# Patient Record
Sex: FEMALE | Race: WHITE | NOT HISPANIC OR LATINO | Employment: FULL TIME | URBAN - METROPOLITAN AREA
[De-identification: names, ages, dates, MRNs, and addresses within clinical notes are randomized per-mention and may not be internally consistent; named-entity substitution may affect disease eponyms.]

---

## 2017-11-09 ENCOUNTER — ALLSCRIPTS OFFICE VISIT (OUTPATIENT)
Dept: OTHER | Facility: OTHER | Age: 22
End: 2017-11-09

## 2017-11-09 DIAGNOSIS — N93.9 ABNORMAL UTERINE AND VAGINAL BLEEDING, UNSPECIFIED (CODE): ICD-10-CM

## 2017-11-11 NOTE — PROGRESS NOTES
Assessment    1  Abnormal uterine bleeding (626 9) (N93 9)   2  Primary anovulatory infertility (628 0) (N97 0)   3  Obesity, morbid, BMI 40 0-49 9 (278 01) (E66 01)    Plan  Abnormal uterine bleeding    · (1) PROLACTIN; Status:Active - Retrospective By Protocol Authorization; Requestedfor:09Nov2017;    Perform:[facility]; Order Comments:FASTING!!!!!; OEY:[id number]; Last Updated By:Edin Carcamo; 11/9/2017 1:37:18 PM;Ordered;uterine bleeding; Ordered By:Iggy Erwin;   · (1) TSH; Status:Active - Retrospective By Protocol Authorization; Requestedfor:09Nov2017;    Perform:[facility]; UVP:[id number]; Last Updated By:Edin Carcamo; 11/9/2017 1:37:18 PM;Ordered;uterine bleeding; Ordered By:Iggy Erwni;   · * US PELVIS COMPLETE (TRANSABDOMINAL AND TRANSVAGINAL); Status:Hold For -Scheduling,Retrospective By Protocol Authorization; Requested XVC:[id number];    Perform:[facility]; ZVS:[id number]; Last Updated By:Edin Carcamo; 11/9/2017 1:37:18 PM;Ordered;uterine bleeding; Ordered By:Iggy Erwin;    Discussion/Summary  Discussion Summary:   AUB -0infertility  Patient has been attempting to conceive for greater than 1 year, and has been sexually active at the appropriate times in her cycle  impact of her excess weight on ovulation discussed with patient and encouraged her to continue her weight loss efforts through her reduction in calories and exercise  order to get fasting prolactin and a TSH drawn  Given order for pelvic ultrasound  advised to continue her prenatal vitamins  will call her with the results of her testing and we will proceed from there  Counseling Documentation With Imm: The patient was counseled regarding diagnostic results,-- instructions for management,-- risk factor reductions,-- prognosis,-- patient and family education,-- impressions,-- risks and benefits of treatment options,-- importance of compliance with treatment   total time of encounter was 25 minutes-- and-- 24 minutes was spent counseling  GYN Discussion and Summary:             Dysfunctional Uterine Bleeding--  Goals and Barriers: The patient has the current Goals:   Irregular bleeding and conceive  The patent has the current Barriers: Morbid obesity  Patient's Capacity to Self-Care: Patient is able to Self-Care  Self Referrals:   Self Referrals: Yes      Chief Complaint  Chief Complaint Free Text Note Form: Pt presents today for aub  History of Present Illness  HPI: 25Year old new patient here to discuss abnormal uterine bleeding  She is also interested in discussing becoming pregnant, as she has been trying to conceive for > 1 year without success   is in the [employer] and she has already seen 1 of the doctors there who told her to lose weight and come back  She states she had been on birth control pills and stopped 1 year ago when she wanted to conceive  She states that her most recent menses has been lasting for about 3-4 weeks, is very light and has minimal cramping  She is taking prenatal vitamins  Her 59-year-old  has not had children with any other women  states she has not had any changes in her weight, she currently weighs 253  takes no medications and has no significant past medical history  history: She is  her  is in [place] but is moving back to [place] soon  She works at a [address] [place] [address] [address] in [place]  Review of Systems   Female ROS: nonmenstrual bleeding,-- periods are irregular-- and-- irregular length of periods, but-- no pelvic pain,-- no pelvic pressure,-- no vaginal discharge,-- no vaginal itching,-- no vaginal lump or mass,-- no vulvar pain,-- no vulvar itching,-- no dysmenorrhea,-- no dysuria-- and-- no urinary loss of control  Active Problems  1  Abnormal uterine bleeding (626 9) (N93 9)    Past Medical History  Active Problems And Past Medical History Reviewed: The active problems and past medical history were reviewed and updated today        Surgical History  Surgical History Reviewed: The surgical history was reviewed and updated today  Family History  Family History Reviewed: The family history was reviewed and updated today  Social History   · Never a smoker  Social History Reviewed: The social history was reviewed and updated today  The social history was reviewed and is unchanged  Current Meds   1  No Reported Medications Recorded  Medication List Reviewed: The medication list was reviewed and updated today  Allergies  1  No Known Drug Allergies    Vitals  Vital Signs    Recorded: [id number] 63:38SY   Systolic 757   Diastolic 64   Height 5 ft 6 in   Weight 253 lb 9 oz   BMI Calculated 40 93   BSA Calculated 2 21   LMP [id number]       Physical Exam   Constitutional  General appearance: No acute distress, well appearing and well nourished  Signatures   Electronically signed by :  Martha Spencer; Nov 9 2017  2:32PM EST                       (Author)    Electronically signed by : Johnnie Crigler, M D ; Nov 10 2017  3:57PM EST

## 2017-11-14 ENCOUNTER — HOSPITAL ENCOUNTER (OUTPATIENT)
Dept: ULTRASOUND IMAGING | Facility: HOSPITAL | Age: 22
Discharge: HOME | End: 2017-11-14
Payer: OTHER GOVERNMENT

## 2017-11-14 DIAGNOSIS — N93.9 ABNORMAL UTERINE AND VAGINAL BLEEDING, UNSPECIFIED (CODE): ICD-10-CM

## 2017-11-14 PROCEDURE — 76830 TRANSVAGINAL US NON-OB: CPT

## 2017-11-14 PROCEDURE — 76856 US EXAM PELVIC COMPLETE: CPT

## 2017-12-28 ENCOUNTER — GENERIC CONVERSION - ENCOUNTER (OUTPATIENT)
Dept: OTHER | Facility: OTHER | Age: 22
End: 2017-12-28

## 2017-12-29 ENCOUNTER — GENERIC CONVERSION - ENCOUNTER (OUTPATIENT)
Dept: OTHER | Facility: OTHER | Age: 22
End: 2017-12-29

## 2018-01-14 VITALS
SYSTOLIC BLOOD PRESSURE: 110 MMHG | WEIGHT: 253.56 LBS | DIASTOLIC BLOOD PRESSURE: 64 MMHG | BODY MASS INDEX: 40.75 KG/M2 | HEIGHT: 66 IN

## 2018-01-22 ENCOUNTER — GENERIC CONVERSION - ENCOUNTER (OUTPATIENT)
Dept: OTHER | Facility: OTHER | Age: 23
End: 2018-01-22

## 2018-01-23 NOTE — MISCELLANEOUS
Message   Recorded as Task   Date: 12/19/2017 01:09 PM, Created By: Yadira Callahan   Task Name: Follow Up   Assigned To: Yadira Callahan   Regarding Patient: Fidelina De Jesus, Status: Active   Comment:    Yadira Callahan - 19 Dec 2017 1:09 PM     TASK CREATED  pt called in stating she finished the progesterone on 12/8  Pt started bleeding again 12/14 and states it has been heavy since  Pt questioning if she should continue to monitor or if she should be concerned  States last time this happened she bled for 7 weeks  How would you like pt to proceed?       ok to lm for pt   Camila Erwin - 20 Dec 2017 3:34 PM     TASK REPLIED TO: Previously Assigned To MolinaterenceCamila  I recommend she monitor it if not too heavy and hopefully will stop soon  another option is to put her on the pill for a few cycles, then when stops it menses may revert back to normal    Yadira Callahan - 20 Dec 2017 4:08 PM     TASK REPLIED TO: Previously Assigned To [facility],Team                      lm to Krissy Blanton - 28 Dec 2017 2:05 PM     TASK REASSIGNED: Previously Assigned To [facility],Team  vm full  unable to lm  will send pt a letter to contact us   please see task below  Active Problems    1  Abnormal uterine bleeding (626 9) (N93 9)   2  Obesity, morbid, BMI 40 0-49 9 (278 01) (E66 01)   3  Primary anovulatory infertility (628 0) (N97 0)    Current Meds   1  Norethindrone Acetate 5 MG Oral Tablet; TAKE 2 TABLET Daily TDD:10 MG; Therapy: [id number] to (Blenda Grills)  Requested for: [id number]; Last   Rx:29Nov2017; Status: ACTIVE - Transmit to Pharmacy - Awaiting Verification   Ordered    Allergies    1  No Known Drug Allergies    Signatures   Electronically signed by :  Zoila Barajas; Dec 28 2017  4:30PM EST                       (Author)

## 2018-01-23 NOTE — MISCELLANEOUS
Message   Recorded as Task   Date: 12/19/2017 01:09 PM, Created By: Celia Hernandez   Task Name: Follow Up   Assigned To: Celia Hernandez   Regarding Patient: Stoney Brewer, Status: Active   Comment:    Celia Hernandez - 19 Dec 2017 1:09 PM     TASK CREATED  pt called in stating she finished the progesterone on 12/8  Pt started bleeding again 12/14 and states it has been heavy since  Pt questioning if she should continue to monitor or if she should be concerned  States last time this happened she bled for 7 weeks  How would you like pt to proceed?       ok to lm for pt   YaimaCamila - 20 Dec 2017 3:34 PM     TASK REPLIED TO: Previously Assigned To Camila Erwin  I recommend she monitor it if not too heavy and hopefully will stop soon  another option is to put her on the pill for a few cycles, then when stops it menses may revert back to normal    Celia Hernandez - 20 Dec 2017 4:08 PM     TASK REPLIED TO: Previously Assigned To [facility],Team                      lm to Krissy Blanton - 28 Dec 2017 2:05 PM     TASK REASSIGNED: Previously Assigned To [facility],Team  vm full  unable to lm  will send pt a letter to contact us   Celia Hernandez - 29 Dec 2017 11:56 AM     TASK REPLIED TO: Previously Assigned To Celia Hernandez                      letter sent   please see task below  Active Problems    1  Abnormal uterine bleeding (626 9) (N93 9)   2  Obesity, morbid, BMI 40 0-49 9 (278 01) (E66 01)   3  Primary anovulatory infertility (628 0) (N97 0)    Current Meds   1  Norethindrone Acetate 5 MG Oral Tablet; TAKE 2 TABLET Daily TDD:10 MG; Therapy: [id number] to (Mil Gutierrez)  Requested for: [id number]; Last   Rx:29Nov2017; Status: ACTIVE - Transmit to Pharmacy - Awaiting Verification   Ordered    Allergies    1  No Known Drug Allergies    Signatures   Electronically signed by :  Rosa M Montanez; Dec 29 2017 12:57PM EST                       (Author)

## 2018-01-24 NOTE — MISCELLANEOUS
Message   Recorded as Task   Date: 01/09/2018 01:17 PM, Created By: Huy Morton   Task Name: Care Coordination   Assigned To: Tee Meeks   Regarding Patient: Nikki Akins, Status: Active   Comment:    Huy Morton - 09 Jan 2018 1:17 PM     TASK CREATED  Patient wanted to get a second opinion  Started LMP 12/14/2017 bled heavy every day with quarter size/golf ball size one at least every day up until last Tuesday 1/2/18 when the bleeding stopped  She is experiencing left lower quad pain/cramping  Patient wants to start to conceive  What do you recommend doing next? phone: [phone]  we can leave a detailed message on her voicemail because of her work schedule it will be difficult to talk on the phone  Falguni Mar - 10 Eldon 2018 9:52 AM     TASK REPLIED TO: Previously Assigned To Falguni Mar  needs appointment to discuss Pat Chapa - 18 Jan 2018 3:50 PM     TASK REASSIGNED: Previously Assigned To Manny Ellison - 22 Jan 2018 9:09 AM     TASK REPLIED TO: Previously Assigned To [facility],Team  lmovm to Krissy Blanton - 22 Jan 2018 12:27 PM     TASK REASSIGNED: Previously Assigned To [facility]Team  please send a letter  please see task below  Active Problems    1  Abnormal uterine bleeding (626 9) (N93 9)   2  Obesity, morbid, BMI 40 0-49 9 (278 01) (E66 01)   3  Primary anovulatory infertility (628 0) (N97 0)    Current Meds   1  Norethindrone Acetate 5 MG Oral Tablet; TAKE 2 TABLET Daily TDD:10 MG; Therapy: [id number] to (Randall Wood)  Requested for: [id number]; Last   Rx:29Nov2017; Status: ACTIVE - Transmit to Pharmacy - Awaiting Verification   Ordered    Allergies    1  No Known Drug Allergies    Signatures   Electronically signed by :  EDNA Rutledge ; Jan 22 2018  5:38PM EST                       (Author)